# Patient Record
Sex: FEMALE | Race: WHITE | URBAN - METROPOLITAN AREA
[De-identification: names, ages, dates, MRNs, and addresses within clinical notes are randomized per-mention and may not be internally consistent; named-entity substitution may affect disease eponyms.]

---

## 2018-02-28 LAB
ALT SERPL-CCNC: 44 U/L (ref 9–72)
AST SERPL-CCNC: 29 U/L (ref 14–59)
CREAT SERPL-MCNC: 0.7 MG/DL (ref 0.5–1)
GLUCOSE SERPL-MCNC: 127 MG/DL (ref 65–100)
POTASSIUM SERPL-SCNC: 4 MMOL/L (ref 3.6–5)
TSH SERPL-ACNC: 1.04 UIU/ML (ref 0.45–5.33)

## 2018-04-27 LAB
ALT SERPL-CCNC: 33 U/L (ref 9–72)
AST SERPL-CCNC: 25 U/L (ref 14–59)
CREAT SERPL-MCNC: 0.7 MG/DL (ref 0.5–1)
GFR SERPL CREATININE-BSD FRML MDRD: 97.76 ML/MIN/1.73M2
GLUCOSE SERPL-MCNC: 128 MG/DL (ref 65–100)
POTASSIUM SERPL-SCNC: 4.2 MMOL/L (ref 3.6–5)
TSH SERPL-ACNC: 0.39 UIU/ML (ref 0.45–5.33)

## 2018-05-03 LAB — HBA1C MFR BLD: 6.2 % (ref 4.5–7)

## 2018-05-08 ENCOUNTER — TRANSFERRED RECORDS (OUTPATIENT)
Dept: HEALTH INFORMATION MANAGEMENT | Facility: CLINIC | Age: 33
End: 2018-05-08

## 2018-05-18 ENCOUNTER — TRANSFERRED RECORDS (OUTPATIENT)
Dept: HEALTH INFORMATION MANAGEMENT | Facility: CLINIC | Age: 33
End: 2018-05-18

## 2018-05-30 ENCOUNTER — MEDICAL CORRESPONDENCE (OUTPATIENT)
Dept: HEALTH INFORMATION MANAGEMENT | Facility: CLINIC | Age: 33
End: 2018-05-30

## 2018-06-26 ENCOUNTER — TRANSFERRED RECORDS (OUTPATIENT)
Dept: HEALTH INFORMATION MANAGEMENT | Facility: CLINIC | Age: 33
End: 2018-06-26

## 2018-06-26 LAB
ALT SERPL-CCNC: 24 U/L (ref 9–72)
AST SERPL-CCNC: 22 U/L (ref 14–59)
CREAT SERPL-MCNC: 0.7 MG/DL (ref 0.5–1)
GLUCOSE SERPL-MCNC: 122 MG/DL (ref 65–100)
POTASSIUM SERPL-SCNC: 4.1 MMOL/L (ref 3.6–5)

## 2018-06-29 ENCOUNTER — TRANSFERRED RECORDS (OUTPATIENT)
Dept: HEALTH INFORMATION MANAGEMENT | Facility: CLINIC | Age: 33
End: 2018-06-29

## 2018-07-24 ENCOUNTER — OFFICE VISIT (OUTPATIENT)
Dept: RHEUMATOLOGY | Facility: CLINIC | Age: 33
End: 2018-07-24
Payer: COMMERCIAL

## 2018-07-24 ENCOUNTER — RADIANT APPOINTMENT (OUTPATIENT)
Dept: GENERAL RADIOLOGY | Facility: CLINIC | Age: 33
End: 2018-07-24
Attending: STUDENT IN AN ORGANIZED HEALTH CARE EDUCATION/TRAINING PROGRAM
Payer: COMMERCIAL

## 2018-07-24 VITALS
SYSTOLIC BLOOD PRESSURE: 126 MMHG | HEIGHT: 67 IN | HEART RATE: 94 BPM | DIASTOLIC BLOOD PRESSURE: 76 MMHG | OXYGEN SATURATION: 97 % | BODY MASS INDEX: 44.57 KG/M2 | WEIGHT: 284 LBS

## 2018-07-24 DIAGNOSIS — E66.01 MORBID OBESITY (H): ICD-10-CM

## 2018-07-24 DIAGNOSIS — M19.90 INFLAMMATORY ARTHRITIS: ICD-10-CM

## 2018-07-24 DIAGNOSIS — R79.82 ELEVATED C-REACTIVE PROTEIN (CRP): ICD-10-CM

## 2018-07-24 DIAGNOSIS — M25.50 ARTHRALGIA, UNSPECIFIED JOINT: ICD-10-CM

## 2018-07-24 DIAGNOSIS — M19.90 INFLAMMATORY ARTHRITIS: Primary | ICD-10-CM

## 2018-07-24 LAB
CK SERPL-CCNC: 117 U/L (ref 30–225)
CRP SERPL-MCNC: 12.4 MG/L (ref 0–8)
ERYTHROCYTE [SEDIMENTATION RATE] IN BLOOD BY WESTERGREN METHOD: 9 MM/H (ref 0–20)

## 2018-07-24 PROCEDURE — 82550 ASSAY OF CK (CPK): CPT | Performed by: STUDENT IN AN ORGANIZED HEALTH CARE EDUCATION/TRAINING PROGRAM

## 2018-07-24 PROCEDURE — 86803 HEPATITIS C AB TEST: CPT | Performed by: STUDENT IN AN ORGANIZED HEALTH CARE EDUCATION/TRAINING PROGRAM

## 2018-07-24 PROCEDURE — 36415 COLL VENOUS BLD VENIPUNCTURE: CPT | Performed by: STUDENT IN AN ORGANIZED HEALTH CARE EDUCATION/TRAINING PROGRAM

## 2018-07-24 PROCEDURE — 86431 RHEUMATOID FACTOR QUANT: CPT | Performed by: STUDENT IN AN ORGANIZED HEALTH CARE EDUCATION/TRAINING PROGRAM

## 2018-07-24 PROCEDURE — 86038 ANTINUCLEAR ANTIBODIES: CPT | Performed by: STUDENT IN AN ORGANIZED HEALTH CARE EDUCATION/TRAINING PROGRAM

## 2018-07-24 PROCEDURE — 87340 HEPATITIS B SURFACE AG IA: CPT | Performed by: STUDENT IN AN ORGANIZED HEALTH CARE EDUCATION/TRAINING PROGRAM

## 2018-07-24 PROCEDURE — 86704 HEP B CORE ANTIBODY TOTAL: CPT | Performed by: STUDENT IN AN ORGANIZED HEALTH CARE EDUCATION/TRAINING PROGRAM

## 2018-07-24 PROCEDURE — 85652 RBC SED RATE AUTOMATED: CPT | Performed by: STUDENT IN AN ORGANIZED HEALTH CARE EDUCATION/TRAINING PROGRAM

## 2018-07-24 PROCEDURE — 99204 OFFICE O/P NEW MOD 45 MIN: CPT | Performed by: STUDENT IN AN ORGANIZED HEALTH CARE EDUCATION/TRAINING PROGRAM

## 2018-07-24 PROCEDURE — 86480 TB TEST CELL IMMUN MEASURE: CPT | Performed by: STUDENT IN AN ORGANIZED HEALTH CARE EDUCATION/TRAINING PROGRAM

## 2018-07-24 PROCEDURE — 73130 X-RAY EXAM OF HAND: CPT | Mod: RT | Performed by: RADIOLOGY

## 2018-07-24 PROCEDURE — 86140 C-REACTIVE PROTEIN: CPT | Performed by: STUDENT IN AN ORGANIZED HEALTH CARE EDUCATION/TRAINING PROGRAM

## 2018-07-24 PROCEDURE — 86200 CCP ANTIBODY: CPT | Performed by: STUDENT IN AN ORGANIZED HEALTH CARE EDUCATION/TRAINING PROGRAM

## 2018-07-24 RX ORDER — BUTALBITAL, ACETAMINOPHEN AND CAFFEINE 50; 325; 40 MG/1; MG/1; MG/1
325 TABLET ORAL PRN
COMMUNITY
Start: 2013-06-14 | End: 2018-07-24

## 2018-07-24 RX ORDER — CLONIDINE HYDROCHLORIDE 0.1 MG/1
0.1 TABLET ORAL DAILY
Refills: 6 | COMMUNITY
Start: 2018-07-16

## 2018-07-24 RX ORDER — MELOXICAM 7.5 MG/1
7.5 TABLET ORAL DAILY PRN
Qty: 30 TABLET | Refills: 0 | Status: SHIPPED | OUTPATIENT
Start: 2018-07-24

## 2018-07-24 RX ORDER — ISOTRETINOIN 40 MG/1
40 CAPSULE, LIQUID FILLED ORAL DAILY
Refills: 0 | COMMUNITY
Start: 2018-06-26

## 2018-07-24 RX ORDER — ISOTRETINOIN 40 MG/1
80 CAPSULE ORAL
COMMUNITY
Start: 2018-06-26

## 2018-07-24 RX ORDER — PRENATAL VIT/IRON FUM/FOLIC AC 27MG-0.8MG
1 TABLET ORAL
COMMUNITY
Start: 2014-06-25

## 2018-07-24 RX ORDER — LAMOTRIGINE 25 MG/1
25 TABLET ORAL 2 TIMES DAILY
Refills: 0 | COMMUNITY
Start: 2017-08-18 | End: 2018-07-24

## 2018-07-24 RX ORDER — LANCETS
EACH MISCELLANEOUS DAILY
Refills: 1 | COMMUNITY
Start: 2018-06-09

## 2018-07-24 RX ORDER — TRAZODONE HYDROCHLORIDE 100 MG/1
TABLET ORAL
COMMUNITY
Start: 2014-04-18

## 2018-07-24 RX ORDER — BLOOD SUGAR DIAGNOSTIC
STRIP MISCELLANEOUS DAILY
Refills: 1 | COMMUNITY
Start: 2018-06-09

## 2018-07-24 NOTE — PATIENT INSTRUCTIONS
-- Will do blood tests     -- Will get Xrays of hands     -- Your symptoms might be suggestive of Rheumatoid arthritis    -- RTC in 8 weeks

## 2018-07-24 NOTE — MR AVS SNAPSHOT
After Visit Summary   7/24/2018    Nicole Pereira    MRN: [id number]           Patient Information     Date Of Birth          1985        Visit Information        Provider Department      7/24/2018 10:30 AM Carla Marroquin MD [facility]        Today's Diagnoses     Inflammatory arthritis    -  1    Morbid obesity (H)          Care Instructions    -- Will do blood tests     -- Will get Xrays of hands     -- Your symptoms might be suggestive of Rheumatoid arthritis    -- RTC in 8 weeks           Follow-ups after your visit        Your next 10 appointments already scheduled     Jul 24, 2018  1:00 PM CDT   XR HAND BILATERAL G/E 3 VIEWS with MGXR1   [facility] ([facility]    [address] [place] [id number] [phone]           Please bring a list of your current medicines to your exam. (Include vitamins, minerals and over-thecounter medicines.) Leave your valuables at home.  Tell your doctor if there is a chance you may be pregnant.  You do not need to do anything special for this exam.            Sep 21, 2018 11:30 AM CDT   Return Visit with Carla Marroquin MD   [facility] ([facility]    [address] [place] [id number] [phone]              Future tests that were ordered for you today     Open Future Orders        Priority Expected Expires Ordered    X-ray bl hand 3+ vw Routine 7/24/2018 2/19/2019 7/24/2018            Who to contact     If you have questions or need follow up information about today's clinic visit or your schedule please contact [facility] directly at [phone].  Normal or non-critical lab and imaging results will be communicated to you by MyChart, letter or phone within 4 business days after the clinic has received the results. If you do not hear from us within 7 days, please contact the clinic through MyChart or phone. If you  "have a critical or abnormal lab result, we will notify you by phone as soon as possible.  Submit refill requests through [employer] App or call your pharmacy and they will forward the refill request to us. Please allow 3 business days for your refill to be completed.          Additional Information About Your Visit        Playmaticshart Information     E[employer] App is an electronic gateway that provides easy, online access to your medical records. With E[employer] App, you can request a clinic appointment, read your test results, renew a prescription or communicate with your care team.     To sign up for E[employer] App visit the website at [email or web address]   You will be asked to enter the access code listed below, as well as some personal information. Please follow the directions to create your username and password.     Your access code is: [email or web address]  Expires: 10/22/2018 11:26 AM     Your access code will  in 90 days. If you need help or a new code, please contact your [facility] or call [phone] for assistance.        Care EveryWhere ID     This is your Care EveryWhere ID. This could be used by other organizations to access your [facility] medical records  [id number]        Your Vitals Were     Pulse Height Pulse Oximetry BMI (Body Mass Index)          94 1.689 m (5' 6.5\") 97% 45.15 kg/m2         Blood Pressure from Last 3 Encounters:   18 126/76    Weight from Last 3 Encounters:   18 128.8 kg (284 lb)              We Performed the Following     Anti Nuclear Leora IgG by IFA with Reflex     CRP inflammation     Cyclic Citrullinated Peptide Antibody IgG     ESR     Hepatitis B core antibody     Hepatitis B surface antigen     Hepatitis C antibody     Quantiferon TB Gold Plus     Rheumatoid factor          Today's Medication Changes          These changes are accurate as of 18 11:26 AM.  If you have any questions, ask your nurse or doctor.               Start taking these " medicines.        Dose/Directions    meloxicam 7.5 MG tablet   Commonly known as:  MOBIC   Used for:  Inflammatory arthritis, Morbid obesity (H)   Started by:  Carla Marroquin MD        Dose:  7.5 mg   Take 1 tablet (7.5 mg) by mouth daily as needed for pain   Quantity:  30 tablet   Refills:  0            Where to get your medicines      These medications were sent to [id number] - [place], [place] - [address]  [address], [facility] [address]     Phone:  [phone]     meloxicam 7.5 MG tablet                Primary Care Provider Office Phone # Fax #    Edyta Youngblood [phone]       [address] [address]        Equal Access to Services     RHIANNON CORBIN : Simba cabezaso Sowarren, waaxda luqadaha, qaybta kaalmada adeegyada, paola alfred. So [facility] [phone].    ATENCIÓN: Si habla español, tiene a duron disposición servicios gratuitos de asistencia lingüística. [facility] [phone].    We comply with applicable federal civil rights laws and [place] laws. We do not discriminate on the basis of race, color, national origin, age, disability, sex, sexual orientation, or gender identity.            Thank you!     Thank you for choosing [facility]  for your care. Our goal is always to provide you with excellent care. Hearing back from our patients is one way we can continue to improve our services. Please take a few minutes to complete the written survey that you may receive in the mail after your visit with us. Thank you!             Your Updated Medication List - Protect others around you: Learn how to safely use, store and throw away your medicines at www.disposemymeds.org.          This list is accurate as of 7/24/18 11:26 AM.  Always use your most recent med list.                   Brand Name Dispense Instructions for use Diagnosis    ACCU-CHEK SMARTVIEW test strip   Generic drug:   blood glucose monitoring      daily    Morbid obesity (H), Inflammatory arthritis       blood glucose monitoring lancets      daily    Morbid obesity (H), Inflammatory arthritis       blood glucose monitoring meter device kit      daily    Morbid obesity (H), Inflammatory arthritis       cholecalciferol 5000 units Caps capsule    vitamin D3     Take 2,000 Units by mouth daily    Morbid obesity (H), Inflammatory arthritis       * CLARAVIS 40 MG capsule   Generic drug:  ISOtretinoin      Take 40 capsules by mouth daily    Morbid obesity (H), Inflammatory arthritis       * ISOtretinoin 40 MG capsule    ACCUTANE     Take 40 mg by mouth    Morbid obesity (H), Inflammatory arthritis       cloNIDine 0.1 MG tablet    CATAPRES     Take 0.1 tablets by mouth daily    Morbid obesity (H), Inflammatory arthritis       meloxicam 7.5 MG tablet    MOBIC    30 tablet    Take 1 tablet (7.5 mg) by mouth daily as needed for pain    Inflammatory arthritis, Morbid obesity (H)       metFORMIN 500 MG tablet    GLUCOPHAGE     Take 500 mg by mouth    Morbid obesity (H), Inflammatory arthritis       prenatal multivitamin plus iron 27-0.8 MG Tabs per tablet      Take 1 tablet by mouth    Morbid obesity (H), Inflammatory arthritis       ranitidine 150 MG capsule    ZANTAC     Take 150 mg by mouth    Morbid obesity (H), Inflammatory arthritis       traZODone 100 MG tablet    DESYREL     at bedtime if needed for Sleep. Take 150 mg at bedtime.    Morbid obesity (H), Inflammatory arthritis       * Notice:  This list has 2 medication(s) that are the same as other medications prescribed for you. Read the directions carefully, and ask your doctor or other care provider to review them with you.

## 2018-07-24 NOTE — PROGRESS NOTES
Rheumatology Clinic Visit     Nicole Pereira MRN# [id number]   YOB: 1985 Age: 32 year old     Date of Visit: July 24, 2018  Primary care provider: Edyta Youngblood          Assessment and Plan:   Assessment     -- Symmetric Polyarthritis   -- Elevated CRP  -- Morbid Obesity  -- Prediabetes  -- Anxiety    Ms Pereira is 33 yo female seen in clinic for evaluation of joint pains in the setting of elevated CRP.    Symmetric polyarthritis: She has pain in her hands, wrists, knees, hips occasionally for the last 5 months.  Has morning stiffness for about an hour.  Hot bath helps her.  She has tried ibuprofen which has not provided significant relief.  She has not noticed any knee effusions, ankle swelling but has noted stiffness in her hands.     On exam she does not have any synovitis and tenderness over any of the joints.  No knee effusions, ankles or MTP, MCP, PIP, DIP joint tenderness.     She describes these joint pains symptoms are more sporadic and not present every day.  She had autoimmune workup done by PCP which revealed elevated CRP of 17, normal sed rate, negative CHAPIN.  She had negative Lyme serology.  Her symptoms of symmetric polyarthritis along with elevated CRP and morning stiffness does raise concern about rheumatoid arthritis.  I will check her rheumatoid serologies, repeat inflammatory markers and recheck CHAPIN.  Will get hepatitis and TB screening in anticipation of starting her on DMARD therapy.    For now since she does not have significant synovitis and tenderness over her joints I will not prescribe prednisone.  She can take long-acting NSAIDs like meloxicam 7.5-15 mg daily on as-needed basis.  We will reevaluate her in 2 months.  If her symptoms get worse will consider prednisone taper and DMARD treatment.      Plan     -- Blood tests -rheumatoid factor, anti-CCP antibody, ESR, CRP, CHAPIN, hepatitis B/and QuantiFERON-TB-     --She can use meloxicam 7.5-15 mg daily on as-needed  basis    --We will get x-rays of bilateral hands to look for any inflammatory arthritis or erosive disease    -- RTC in 8 weeks           Active Problem List:     Patient Active Problem List    Diagnosis Date Noted     Morbid obesity (H) 07/24/2018     Priority: Medium     Joint pain      Priority: Medium     Elevated C-reactive protein (CRP)      Priority: Medium            History of Present Illness:   Nicole Pereira is a 32 year old female with PMH of Prediabetes, anxiety seen in the clinic in consultation at request of PCP Edyta Youngblood for evaluation of joint pains.     She reports that for the last 5 months she has been having pain in her hands, wrists, knees and hips.  She has stiffness in the morning which lasts for about an hour.  She also has weakness in her arms and legs and they feel like noodles.  She is taking Tylenol or ibuprofen which does help to some extent.  Hot bath in the morning helps.  She has not noticed any swelling of her joints, no knee effusions.  Denies any family history of inflammatory arthritis.    She had autoimmune workup done by her PCP which revealed elevated CRP levels, normal sed rate, negative CHAPIN, Lyme serology.  Denies history of psoriasis, ulcerative colitis or chron's disease. No h/o iritis, enthesitis, finger or toe swelling like dactylitis, plantar fascitis or heel pain. Denies any raynauds, malar rash, photosensitivity, recurrent mouth/genital ulcers, sicca symptoms, pleuritic chest pains, recurrent sinusitis/rhinitis, swallowing difficulty, hearing or visual changes recently.   No h/o arterial/venous thrombosis in the past. Denies any tick bite, recent GI/ infection.             Review of Systems:   Review Of Systems  Constitutional: denies fever, chills, night sweats and weight loss.  Skin: No skin rash.  Eyes: No dryness or irritation in eyes. No episode of eye inflammation or redness.   Ears/Nose/Throat: no recurrent sinus infections.  Respiratory: No shortness  "of breath, dyspnea on exertion, cough, or hemoptysis  Cardiovascular: no chest pain or palpitations.  Gastrointestinal: no nausea, vomiting, abdominal pain.  Normal bowel movements.  Genitourinary: no dysuria, frequency  or hematuria.  Musculoskeletal: as in HPI  Neurologic: no numbness, tingling.  Psychiatric: she has anxiety issues.   Hematologic/Lymphatic/Immunologic: no history of easy bruising, petechia or purpura.  No abnormal bleeding.   Endocrine: no h/o thyroid disease or + Diabetes.                  Past Medical History:     Past Medical History:   Diagnosis Date     Elevated C-reactive protein (CRP)      Joint pain      Morbid obesity (H)      Past Surgical History:   Procedure Laterality Date     NO HISTORY OF SURGERY              Social History:     Social History     Occupational History     Not on file.     Social History Main Topics     Smoking status: Never Smoker     Smokeless tobacco: Never Used     Alcohol use Not on file     Drug use: Not on file     Sexual activity: Not on file            Family History:     Family History   Problem Relation Age of Onset     Autoimmune Disease No family hx of             Allergies:     Allergies   Allergen Reactions     Sumatriptan Shortness Of Breath     Codeine Itching     Influenza Vaccines Other (See Comments)     Red, hot, baseball size local reaction.     Latex Hives     All over from wearing gloves     Liquid Adhesive Other (See Comments)     \"open oozing sores\"            Medications:     Current Outpatient Prescriptions   Medication Sig Dispense Refill     ACCU-CHEK SMARTVIEW test strip daily  1     blood glucose monitoring (ACCU-CHEK FASTCLIX) lancets daily  1     blood glucose monitoring (ACCU-CHEK AGNES SMARTVIEW) meter device kit daily  0     cholecalciferol (VITAMIN D3) 5000 units CAPS capsule Take 2,000 Units by mouth daily  4     CLARAVIS 40 MG capsule Take 40 capsules by mouth daily  0     cloNIDine (CATAPRES) 0.1 MG tablet Take 0.1 tablets by " "mouth daily  6     ISOtretinoin (ACCUTANE) 40 MG capsule Take 40 mg by mouth       meloxicam (MOBIC) 7.5 MG tablet Take 1 tablet (7.5 mg) by mouth daily as needed for pain 30 tablet 0     metFORMIN (GLUCOPHAGE) 500 MG tablet Take 500 mg by mouth  1     Prenatal Vit-Fe Fumarate-FA (PRENATAL MULTIVITAMIN PLUS IRON) 27-0.8 MG TABS per tablet Take 1 tablet by mouth       ranitidine (ZANTAC) 150 MG capsule Take 150 mg by mouth       traZODone (DESYREL) 100 MG tablet at bedtime if needed for Sleep. Take 150 mg at bedtime.              Physical Exam:   Blood pressure 126/76, pulse 94, height 1.689 m (5' 6.5\"), weight 128.8 kg (284 lb), SpO2 97 %.  Wt Readings from Last 4 Encounters:   07/24/18 128.8 kg (284 lb)       Constitutional: morbidly obese, appearing stated age; cooperative  Eyes: nl EOM, PERRLA, vision, conjunctiva, sclera  ENT: nl external ears, nose, hearing, lips, teeth, gums, throat  No mucous membrane lesions, normal saliva pool  Neck: no mass or thyroid enlargement  Resp: lungs clear to auscultation, nl to palpation  CV: RRR, no murmurs, rubs or gallops, no edema  GI: no ABD mass or tenderness, no HSM  : not tested  Lymph: no cervical, supraclavicular, inguinal or epitrochlear nodes    MS: All TMJ, neck, shoulder, elbow, wrist, MCP/PIP/DIP, spine, hip, knee, ankle, and foot MTP/IP joints were examined.   -- No active synovitis or deformity. Full ROM.  Normal  strength.   -- No dactylitis,  tenosynovitis, enthesopathy.    Skin: no nail pitting, alopecia, rash, nodules or lesions  Neuro: nl cranial nerves, strength, sensation, DTRs.   Psych: nl judgement, orientation, memory, affect.         Data:     Outside studies reviewed:     I reviewed her test results from [facility]  5/18/18   CK - 124,  CRP - 17.4, Lyme serology - Neg Thyroid antibodies - Neg  CHAPIN - Neg  Hb A1c - 6.2 , Vitamin D - 18     Normal CBC, CMP     Reviewed Rheumatology lab flowsheet    Carla Marroquin, " MD  [facility]  Department of Rheumatology & Autoimmune Disorders  [facility]: [phone]   Pager - [phone]

## 2018-07-24 NOTE — NURSING NOTE
"Nicole Pereira's goals for this visit include:   Chief Complaint   Patient presents with     Consult     joint pain, weakness      She requests these members of her care team be copied on today's visit information: pcp    PCP: Edyta Youngblood    Referring Provider:  Edyta Youngblood  [address] [address]    /76 (BP Location: Left arm, Patient Position: Sitting, Cuff Size: Adult Large)  Pulse 94  Ht 1.689 m (5' 6.5\")  Wt 128.8 kg (284 lb)  SpO2 97%  BMI 45.15 kg/m2    Do you need any medication refills at today's visit? n  "

## 2018-07-25 LAB
ANA SER QL IF: NEGATIVE
CCP AB SER IA-ACNC: 1 U/ML
HBV CORE AB SERPL QL IA: NONREACTIVE
HBV SURFACE AG SERPL QL IA: NONREACTIVE
HCV AB SERPL QL IA: NONREACTIVE
RHEUMATOID FACT SER NEPH-ACNC: <20 IU/ML (ref 0–20)

## 2018-07-26 LAB
GAMMA INTERFERON BACKGROUND BLD IA-ACNC: 0.03 IU/ML
M TB IFN-G BLD-IMP: NEGATIVE
M TB IFN-G CD4+ BCKGRND COR BLD-ACNC: >10 IU/ML
MITOGEN IGNF BCKGRD COR BLD-ACNC: 0 IU/ML
MITOGEN IGNF BCKGRD COR BLD-ACNC: 0.01 IU/ML

## 2018-09-21 ENCOUNTER — OFFICE VISIT (OUTPATIENT)
Dept: RHEUMATOLOGY | Facility: CLINIC | Age: 33
End: 2018-09-21
Payer: COMMERCIAL

## 2018-09-21 VITALS
DIASTOLIC BLOOD PRESSURE: 83 MMHG | WEIGHT: 283 LBS | HEART RATE: 93 BPM | SYSTOLIC BLOOD PRESSURE: 136 MMHG | TEMPERATURE: 98.9 F | BODY MASS INDEX: 44.99 KG/M2 | OXYGEN SATURATION: 98 %

## 2018-09-21 DIAGNOSIS — R79.82 ELEVATED C-REACTIVE PROTEIN (CRP): Primary | ICD-10-CM

## 2018-09-21 DIAGNOSIS — R21 SKIN RASH: ICD-10-CM

## 2018-09-21 PROCEDURE — 99214 OFFICE O/P EST MOD 30 MIN: CPT | Performed by: STUDENT IN AN ORGANIZED HEALTH CARE EDUCATION/TRAINING PROGRAM

## 2018-09-21 PROCEDURE — 82595 ASSAY OF CRYOGLOBULIN: CPT | Performed by: STUDENT IN AN ORGANIZED HEALTH CARE EDUCATION/TRAINING PROGRAM

## 2018-09-21 PROCEDURE — 36415 COLL VENOUS BLD VENIPUNCTURE: CPT | Performed by: STUDENT IN AN ORGANIZED HEALTH CARE EDUCATION/TRAINING PROGRAM

## 2018-09-21 PROCEDURE — 86255 FLUORESCENT ANTIBODY SCREEN: CPT | Performed by: STUDENT IN AN ORGANIZED HEALTH CARE EDUCATION/TRAINING PROGRAM

## 2018-09-21 PROCEDURE — 83516 IMMUNOASSAY NONANTIBODY: CPT | Performed by: STUDENT IN AN ORGANIZED HEALTH CARE EDUCATION/TRAINING PROGRAM

## 2018-09-21 PROCEDURE — 83876 ASSAY MYELOPEROXIDASE: CPT | Performed by: STUDENT IN AN ORGANIZED HEALTH CARE EDUCATION/TRAINING PROGRAM

## 2018-09-21 ASSESSMENT — PAIN SCALES - GENERAL: PAINLEVEL: NO PAIN (0)

## 2018-09-21 NOTE — PROGRESS NOTES
Rheumatology Clinic Visit     Nicole Pereira MRN# [id number]   YOB: 1985 Age: 32 year old     Date of Visit: September 21, 2018  Primary care provider: Edyta Youngblood          Assessment and Plan:   Assessment     -- Symmetric Polyarthritis   -- Elevated CRP  -- New skin rash  -- Morbid Obesity  -- Prediabetes  -- Anxiety    Ms Pereira is 31 yo female seen in clinic for evaluation of joint pains in the setting of elevated CRP.    Symmetric polyarthritis: She has pain in her hands, wrists, knees, hips occasionally for the last 5 months.  Has morning stiffness for about an hour on some days.  Hot bath helps her.  She has tried ibuprofen which has not provided significant relief.  She has not noticed any knee effusions, ankle swelling but has noted stiffness in her hands.     On exam she does not have any synovitis and tenderness over any of the joints.  No knee effusions, ankles or MTP, MCP, PIP, DIP joint tenderness.     She describes these joint pains symptoms are more sporadic and not present every day.  She had autoimmune workup done by PCP which revealed elevated CRP of 17, normal sed rate, negative CHAPIN.  She had negative Lyme serology.      Her autoimmune work up done on last OV visit has shown negative RF, ACPA, CHAPIN, Hep B/C, TB, elevated CRP of 12.4 with normal sed rate.     5 days ago she developed petechial skin rash over her legs, feet, arms which resolved after taking prednisone prescribed by her PCP. She still has tiny petechial non blanching lesions on physical exam. I will get ANCA titers, vasculitis panel, Cryoglobulins.     For joint pains I recommended her to loose weight, be more physically active and use Tylenol on as needed basis. If in case joint pains worsens over time will reevaluate. Suspicion for RA and autoimmune connective tissue disease is low.       Plan     -- Blood tests - ANCA titers, Cryo, vasculitis panel for the skin rash.     -- Suspicion for Lupus and RA is low.  I recommended to loose weight and be active to see if that helps with the joint pains.     -- RTC based on blood tests.             Active Problem List:     Patient Active Problem List    Diagnosis Date Noted     Morbid obesity (H) 07/24/2018     Priority: Medium     Joint pain      Priority: Medium     Elevated C-reactive protein (CRP)      Priority: Medium            History of Present Illness:   Nicole Pereira is a 32 year old female with PMH of Prediabetes, anxiety seen in the clinic in consultation at request of PCP Edyta Youngblood for evaluation of joint pains.     September 21, 2018 - Occasionally she has Pain in feet, ankles and knees. Pain in hands in PIP and MCP joints very rarely. Sometimes in her elbows, Shoulders. Random episodes every few weeks which last for a day. She developed a skin rash on Sunday and had it over her arms, hands, legs and feet. She started on Prednisone 20 mg x 3 days, 10 mg x 3 days, 1 tab x 3 days and benadryl, zyrtec which helped.     History from initial visit : She reports that for the last 5 months she has been having pain in her hands, wrists, knees and hips.  She has stiffness in the morning which lasts for about an hour.  She also has weakness in her arms and legs and they feel like noodles.  She is taking Tylenol or ibuprofen which does help to some extent.  Hot bath in the morning helps.  She has not noticed any swelling of her joints, no knee effusions.  Denies any family history of inflammatory arthritis.    She had autoimmune workup done by her PCP which revealed elevated CRP levels, normal sed rate, negative CHAPIN, Lyme serology.  Denies history of psoriasis, ulcerative colitis or chron's disease. No h/o iritis, enthesitis, finger or toe swelling like dactylitis, plantar fascitis or heel pain. Denies any raynauds, malar rash, photosensitivity, recurrent mouth/genital ulcers, sicca symptoms, pleuritic chest pains, recurrent sinusitis/rhinitis, swallowing difficulty,  "hearing or visual changes recently.   No h/o arterial/venous thrombosis in the past. Denies any tick bite, recent GI/ infection.             Review of Systems:   Review Of Systems  Constitutional: denies fever, chills, night sweats and weight loss.  Skin:+skin rash.  Eyes: No dryness or irritation in eyes. No episode of eye inflammation or redness.   Ears/Nose/Throat: no recurrent sinus infections.  Respiratory: No shortness of breath, dyspnea on exertion, cough, or hemoptysis  Cardiovascular: no chest pain or palpitations.  Gastrointestinal: no nausea, vomiting, abdominal pain.  Normal bowel movements.  Genitourinary: no dysuria, frequency  or hematuria.  Musculoskeletal: as in HPI  Neurologic: no numbness, tingling.  Psychiatric: she has anxiety issues.   Hematologic/Lymphatic/Immunologic: no history of easy bruising, petechia or purpura.  No abnormal bleeding.   Endocrine: no h/o thyroid disease or + Diabetes.                  Past Medical History:     Past Medical History:   Diagnosis Date     Elevated C-reactive protein (CRP)      Joint pain      Morbid obesity (H)      Past Surgical History:   Procedure Laterality Date     NO HISTORY OF SURGERY              Social History:     Social History     Occupational History     Not on file.     Social History Main Topics     Smoking status: Never Smoker     Smokeless tobacco: Never Used     Alcohol use Not on file     Drug use: Not on file     Sexual activity: Not on file            Family History:     Family History   Problem Relation Age of Onset     Autoimmune Disease No family hx of             Allergies:     Allergies   Allergen Reactions     Sumatriptan Shortness Of Breath     Codeine Itching     Influenza Vaccines Other (See Comments)     Red, hot, baseball size local reaction.     Latex Hives     All over from wearing gloves     Liquid Adhesive Other (See Comments)     \"open oozing sores\"            Medications:     Current Outpatient Prescriptions "   Medication Sig Dispense Refill     Cetirizine HCl (ZYRTEC ALLERGY PO) Take by mouth daily       cholecalciferol (VITAMIN D3) 5000 units CAPS capsule Take 2,000 Units by mouth daily  4     CLARAVIS 40 MG capsule Take 40 capsules by mouth daily  0     cloNIDine (CATAPRES) 0.1 MG tablet Take 0.1 tablets by mouth daily  6     DiphenhydrAMINE HCl (BENADRYL PO)        ISOtretinoin (ACCUTANE) 40 MG capsule Take 80 mg by mouth        meloxicam (MOBIC) 7.5 MG tablet Take 1 tablet (7.5 mg) by mouth daily as needed for pain 30 tablet 0     metFORMIN (GLUCOPHAGE) 500 MG tablet Take 500 mg by mouth  1     PREDNISONE PO Take 60 mg by mouth Currently on a titration down       Prenatal Vit-Fe Fumarate-FA (PRENATAL MULTIVITAMIN PLUS IRON) 27-0.8 MG TABS per tablet Take 1 tablet by mouth       ranitidine (ZANTAC) 150 MG capsule Take 150 mg by mouth       traZODone (DESYREL) 100 MG tablet at bedtime if needed for Sleep. Take 150 mg at bedtime.       ACCU-CHEK SMARTVIEW test strip daily  1     blood glucose monitoring (ACCU-CHEK FASTCLIX) lancets daily  1     blood glucose monitoring (ACCU-CHEK AGNES SMARTVIEW) meter device kit daily  0            Physical Exam:   Blood pressure 136/83, pulse 93, temperature 98.9  F (37.2  C), temperature source Oral, weight 128.4 kg (283 lb), SpO2 98 %.  Wt Readings from Last 4 Encounters:   09/21/18 128.4 kg (283 lb)   07/24/18 128.8 kg (284 lb)       Constitutional: morbidly obese, appearing stated age; cooperative  Eyes: nl EOM, PERRLA, vision, conjunctiva, sclera  ENT: nl external ears, nose, hearing, lips, teeth, gums, throat  No mucous membrane lesions, normal saliva pool  Neck: no mass or thyroid enlargement  Resp: lungs clear to auscultation, nl to palpation  CV: RRR, no murmurs, rubs or gallops, no edema  GI: no ABD mass or tenderness, no HSM  : not tested  Lymph: no cervical, supraclavicular, inguinal or epitrochlear nodes    MS: All TMJ, neck, shoulder, elbow, wrist, MCP/PIP/DIP, spine,  hip, knee, ankle, and foot MTP/IP joints were examined.   -- No active synovitis or deformity. Full ROM.  Normal  strength.   -- No dactylitis,  tenosynovitis, enthesopathy.    Skin: no nail pitting, alopecia, rash, nodules or lesions. She has new petechial skin rash non blanching over her extremities which is clearing now.   Neuro: nl cranial nerves, strength, sensation, DTRs.   Psych: nl judgement, orientation, memory, affect.         Data:     Outside studies reviewed:     I reviewed her test results from [facility] clinic  5/18/18   CK - 124,  CRP - 17.4, Lyme serology - Neg Thyroid antibodies - Neg  CHAPIN - Neg  Hb A1c - 6.2 , Vitamin D - 18     Normal CBC, CMP     Reviewed Rheumatology lab flowsheet    Carla Marroquin MD  [facility]  Department of Rheumatology & Autoimmune Disorders  [facility]: [phone]   Pager - [phone]

## 2018-09-21 NOTE — MR AVS SNAPSHOT
After Visit Summary   9/21/2018    Nicole Pereira    MRN: [id number]           Patient Information     Date Of Birth          1985        Visit Information        Provider Department      9/21/2018 11:30 AM Carla Marroquin MD [facility]        Today's Diagnoses     Elevated C-reactive protein (CRP)    -  1    Skin rash          Care Instructions    -- My suspicion of Lupus and arthritis is low. But in case your joint pains become more frequent then will reevaluate you    -- For skin rash I will get vasculitis tests.     -- RTC on as needed basis.           Follow-ups after your visit        Who to contact     If you have questions or need follow up information about today's clinic visit or your schedule please contact [facility] directly at [phone].  Normal or non-critical lab and imaging results will be communicated to you by MyChart, letter or phone within 4 business days after the clinic has received the results. If you do not hear from us within 7 days, please contact the clinic through MyChart or phone. If you have a critical or abnormal lab result, we will notify you by phone as soon as possible.  Submit refill requests through [employer] or call your pharmacy and they will forward the refill request to us. Please allow 3 business days for your refill to be completed.          Additional Information About Your Visit        [employer] Information     [employer] is an electronic gateway that provides easy, online access to your medical records. With [employer]stream, you can request a clinic appointment, read your test results, renew a prescription or communicate with your care team.     To sign up for [employer] visit the website at [email or web address]   You will be asked to enter the access code listed below, as well as some personal information. Please follow the directions to create your username and password.     Your access code is: [email or web address]  Expires:  10/22/2018 11:26 AM     Your access code will  in 90 days. If you need help or a new code, please contact your [facility] or call [phone] for assistance.        Care EveryWhere ID     This is your Care EveryWhere ID. This could be used by other organizations to access your [place] medical records  [id number]        Your Vitals Were     Pulse Temperature Pulse Oximetry BMI (Body Mass Index)          93 98.9  F (37.2  C) (Oral) 98% 44.99 kg/m2         Blood Pressure from Last 3 Encounters:   18 136/83   18 126/76    Weight from Last 3 Encounters:   18 128.4 kg (283 lb)   18 128.8 kg (284 lb)              We Performed the Following     ANCA IgG by IFA with Reflex to Titer     ANCA Vasculitis panel     Cryoglobulin quantitative        Primary Care Provider Office Phone # Fax #    Edyta Youngblood [phone]       [address] [address]        Equal Access to Services     RHIANNON CORBIN : Hadii aad ku hadasho Soomaali, waaxda luqadaha, qaybta kaalmada adeegyada, waxay idiin haynilan yudy khrajendra montgomery . So [facility] [phone].    ATENCIÓN: Si habla español, tiene a duron disposición servicios gratuitos de asistencia lingüística. [facility] [phone].    We comply with applicable federal civil rights laws and [place] laws. We do not discriminate on the basis of race, color, national origin, age, disability, sex, sexual orientation, or gender identity.            Thank you!     Thank you for choosing [facility]  for your care. Our goal is always to provide you with excellent care. Hearing back from our patients is one way we can continue to improve our services. Please take a few minutes to complete the written survey that you may receive in the mail after your visit with us. Thank you!             Your Updated Medication List - Protect others around you: Learn how to safely use, store  and throw away your medicines at www.disposemymeds.org.          This list is accurate as of 9/21/18 12:03 PM.  Always use your most recent med list.                   Brand Name Dispense Instructions for use Diagnosis    ACCU-CHEK SMARTVIEW test strip   Generic drug:  blood glucose monitoring      daily    Morbid obesity (H), Inflammatory arthritis       BENADRYL PO       Elevated C-reactive protein (CRP), Skin rash       blood glucose monitoring lancets      daily    Morbid obesity (H), Inflammatory arthritis       blood glucose monitoring meter device kit      daily    Morbid obesity (H), Inflammatory arthritis       cholecalciferol 5000 units Caps capsule    vitamin D3     Take 2,000 Units by mouth daily    Morbid obesity (H), Inflammatory arthritis       * CLARAVIS 40 MG capsule   Generic drug:  ISOtretinoin      Take 40 capsules by mouth daily    Morbid obesity (H), Inflammatory arthritis       * ISOtretinoin 40 MG capsule    ACCUTANE     Take 80 mg by mouth    Morbid obesity (H), Inflammatory arthritis       cloNIDine 0.1 MG tablet    CATAPRES     Take 0.1 tablets by mouth daily    Morbid obesity (H), Inflammatory arthritis       meloxicam 7.5 MG tablet    MOBIC    30 tablet    Take 1 tablet (7.5 mg) by mouth daily as needed for pain    Inflammatory arthritis, Morbid obesity (H)       metFORMIN 500 MG tablet    GLUCOPHAGE     Take 500 mg by mouth    Morbid obesity (H), Inflammatory arthritis       PREDNISONE PO      Take 60 mg by mouth Currently on a titration down    Elevated C-reactive protein (CRP), Skin rash       prenatal multivitamin plus iron 27-0.8 MG Tabs per tablet      Take 1 tablet by mouth    Morbid obesity (H), Inflammatory arthritis       ranitidine 150 MG capsule    ZANTAC     Take 150 mg by mouth    Morbid obesity (H), Inflammatory arthritis       traZODone 100 MG tablet    DESYREL     at bedtime if needed for Sleep. Take 150 mg at bedtime.    Morbid obesity (H), Inflammatory arthritis        ZYRTEC ALLERGY PO      Take by mouth daily    Elevated C-reactive protein (CRP), Skin rash       * Notice:  This list has 2 medication(s) that are the same as other medications prescribed for you. Read the directions carefully, and ask your doctor or other care provider to review them with you.

## 2018-09-21 NOTE — NURSING NOTE
Nicole Pereira's goals for this visit include:   Chief Complaint   Patient presents with     RECHECK     Follow up       She requests these members of her care team be copied on today's visit information: PCP    PCP: Edyta Youngblood    Referring Provider:  No referring provider defined for this encounter.    /83 (BP Location: Left arm, Patient Position: Chair, Cuff Size: Adult Large)  Pulse 93  Temp 98.9  F (37.2  C) (Oral)  Wt 128.4 kg (283 lb)  SpO2 98%  BMI 44.99 kg/m2    Do you need any medication refills at today's visit? None      Natasha Flores, CMA

## 2018-09-21 NOTE — PATIENT INSTRUCTIONS
-- My suspicion of Lupus and arthritis is low. But in case your joint pains become more frequent then will reevaluate you    -- For skin rash I will get vasculitis tests.     -- RTC on as needed basis.

## 2018-09-24 LAB
ANCA AB PATTERN SER IF-IMP: NORMAL
C-ANCA TITR SER IF: NORMAL {TITER}

## 2018-09-27 LAB — CRYOGLOB SER QL: NEGATIVE %

## 2018-09-28 LAB
MYELOPEROXIDASE AB SER-ACNC: <0.2 AI (ref 0–0.9)
PROTEINASE3 IGG SER-ACNC: <0.2 AI (ref 0–0.9)